# Patient Record
Sex: FEMALE | Race: WHITE | Employment: FULL TIME | URBAN - METROPOLITAN AREA
[De-identification: names, ages, dates, MRNs, and addresses within clinical notes are randomized per-mention and may not be internally consistent; named-entity substitution may affect disease eponyms.]

---

## 2019-11-18 ENCOUNTER — HOSPITAL ENCOUNTER (OUTPATIENT)
Age: 64
Discharge: HOME | End: 2019-11-18
Payer: COMMERCIAL

## 2019-11-18 ENCOUNTER — APPOINTMENT (OUTPATIENT)
Dept: GENERAL RADIOLOGY | Age: 64
End: 2019-11-18
Attending: PHYSICIAN ASSISTANT
Payer: COMMERCIAL

## 2019-11-18 VITALS
OXYGEN SATURATION: 97 % | WEIGHT: 153 LBS | HEART RATE: 83 BPM | BODY MASS INDEX: 24 KG/M2 | TEMPERATURE: 99 F | SYSTOLIC BLOOD PRESSURE: 132 MMHG | RESPIRATION RATE: 16 BRPM | DIASTOLIC BLOOD PRESSURE: 83 MMHG

## 2019-11-18 DIAGNOSIS — S92.919A CLOSED NONDISPLACED FRACTURE OF PROXIMAL PHALANX OF TOE: ICD-10-CM

## 2019-11-18 DIAGNOSIS — S82.831A OTHER CLOSED FRACTURE OF DISTAL END OF RIGHT FIBULA, INITIAL ENCOUNTER: Primary | ICD-10-CM

## 2019-11-18 PROCEDURE — 99204 OFFICE O/P NEW MOD 45 MIN: CPT

## 2019-11-18 PROCEDURE — 73610 X-RAY EXAM OF ANKLE: CPT | Performed by: PHYSICIAN ASSISTANT

## 2019-11-18 PROCEDURE — 73630 X-RAY EXAM OF FOOT: CPT | Performed by: PHYSICIAN ASSISTANT

## 2019-11-18 PROCEDURE — 29515 APPLICATION SHORT LEG SPLINT: CPT

## 2019-11-18 NOTE — ED PROVIDER NOTES
Patient Seen in: [id number] [facility]      History   Patient presents with: Foot Injury    Stated Complaint: ANKLE PAIN/WC    HPI    76-year-old female presents to the clinic for evaluation of right ankle and foot pain for 5 to 6 days.   Lance normal range of motion, no deformity and normal pulse. No tenderness. Right foot: Normal range of motion and normal capillary refill. Tenderness (base of all toes, bruising) and swelling present. No deformity.    Skin:     General: Skin is warm and dry

## (undated) NOTE — LETTER
Date & Time: 11/18/2019, 5:34 PM  Patient: Mahnaz Arizabetseyon  Encounter Provider(s):    HANSEL Bella       To Whom It May Concern:    Savi Kimble was seen and treated in our department on 11/18/2019.  She can return to work with these limitation